# Patient Record
Sex: FEMALE | Race: WHITE | Employment: OTHER | URBAN - METROPOLITAN AREA
[De-identification: names, ages, dates, MRNs, and addresses within clinical notes are randomized per-mention and may not be internally consistent; named-entity substitution may affect disease eponyms.]

---

## 2017-01-16 ENCOUNTER — HOSPITAL ENCOUNTER (OUTPATIENT)
Dept: MRI IMAGING | Age: 82
Discharge: HOME | End: 2017-01-16
Attending: PSYCHIATRY & NEUROLOGY
Payer: MEDICARE

## 2017-01-16 DIAGNOSIS — D49.6 CAVERNOUS SINUS TUMOR (HCC): ICD-10-CM

## 2017-01-16 LAB — CREAT BLD-MCNC: 0.8 MG/DL (ref 0.6–1.3)

## 2017-01-16 PROCEDURE — 70553 MRI BRAIN STEM W/O & W/DYE: CPT

## 2017-01-16 PROCEDURE — 82565 ASSAY OF CREATININE: CPT

## 2017-01-16 PROCEDURE — 74011250636 HC RX REV CODE- 250/636: Performed by: PSYCHIATRY & NEUROLOGY

## 2017-01-16 PROCEDURE — A9585 GADOBUTROL INJECTION: HCPCS | Performed by: PSYCHIATRY & NEUROLOGY

## 2017-01-16 RX ADMIN — GADOBUTROL 7.5 ML: 604.72 INJECTION INTRAVENOUS at 13:33

## 2017-09-21 ENCOUNTER — HOSPITAL ENCOUNTER (OUTPATIENT)
Dept: ULTRASOUND IMAGING | Age: 82
Discharge: HOME | End: 2017-09-21
Attending: FAMILY MEDICINE
Payer: MEDICARE

## 2017-09-21 DIAGNOSIS — M79.661 BILATERAL CALF PAIN: ICD-10-CM

## 2017-09-21 DIAGNOSIS — M79.89 SWELLING OF LIMB: ICD-10-CM

## 2017-09-21 DIAGNOSIS — M79.662 BILATERAL CALF PAIN: ICD-10-CM

## 2017-09-21 PROCEDURE — 93971 EXTREMITY STUDY: CPT

## 2019-05-23 ENCOUNTER — HOSPITAL ENCOUNTER (OUTPATIENT)
Dept: VASCULAR SURGERY | Age: 84
Discharge: HOME | End: 2019-05-23
Attending: FAMILY MEDICINE
Payer: MEDICARE

## 2019-05-23 DIAGNOSIS — M79.651 RIGHT THIGH PAIN: ICD-10-CM

## 2019-05-23 PROCEDURE — 93971 EXTREMITY STUDY: CPT

## 2020-09-02 ENCOUNTER — HOSPITAL ENCOUNTER (OUTPATIENT)
Dept: MRI IMAGING | Age: 85
Discharge: HOME | End: 2020-09-02
Attending: NURSE PRACTITIONER
Payer: MEDICARE

## 2020-09-02 DIAGNOSIS — M51.36 DDD (DEGENERATIVE DISC DISEASE), LUMBAR: ICD-10-CM

## 2020-09-02 DIAGNOSIS — M54.50 LOW BACK PAIN, UNSPECIFIED BACK PAIN LATERALITY, UNSPECIFIED CHRONICITY, UNSPECIFIED WHETHER SCIATICA PRESENT: ICD-10-CM

## 2020-09-02 DIAGNOSIS — M54.31 RIGHT SIDED SCIATICA: ICD-10-CM

## 2020-09-02 PROCEDURE — 72148 MRI LUMBAR SPINE W/O DYE: CPT

## 2022-01-11 RX ORDER — OMEPRAZOLE 20 MG/1
20 TABLET, DELAYED RELEASE ORAL
COMMUNITY

## 2022-01-11 RX ORDER — DOXEPIN HYDROCHLORIDE 10 MG/1
10 CAPSULE ORAL
COMMUNITY

## 2022-01-11 RX ORDER — CHOLECALCIFEROL (VITAMIN D3) 125 MCG
1 CAPSULE ORAL AS NEEDED
COMMUNITY

## 2022-01-11 NOTE — PERIOP NOTES
[facility]  Ambulatory Surgery Unit  Pre-operative Instructions    Procedure Date  1/18            Tentative Arrival Time 1:15pm      1. On the day of your procedure, please report to the Ambulatory Surgery Unit Registration Desk and sign in at your designated time. The Ambulatory Surgery Unit is located in [facility] on the Critical access hospital side of the [facility] across from the [facility] building. Please have all of your health insurance cards and a photo ID. 2. You must have someone with you to drive you home as directed by your surgeon. 3. You may have a light breakfast and take normal morning medications. 4. We recommend you do not drink any alcoholic beverages for 24 hours before and after your procedure. 5. Contact your surgeons office for instructions on the following medications: non-steroidal anti-inflammatory drugs (i.e. Advil, Aleve), vitamins, and supplements. (Some surgeons will want you to stop these medications prior to surgery and others may allow you to take them)   **If you are currently taking Plavix, Coumadin, Aspirin and/or other blood-thinning agents, contact your surgeon for instructions. ** Your surgeon will partner with the physician prescribing these medications to determine if it is safe to stop or if you need to continue taking. Please do not stop taking these medications without instructions from your surgeon. 6. In an effort to help prevent surgical site infection, we ask that you shower with an anti-bacterial soap (i.e. Dial or Safeguard) on the morning of your procedure. Do not apply any lotions, powders, or deodorants after showering. 7. Wear comfortable clothes. Wear glasses instead of contacts. Do not bring any jewelry or money (other than copays or fees as instructed). Do not wear make-up, particularly mascara, the morning of your procedure. Wear your hair loose or down, no ponytails, buns, steve pins or clips.  All body piercings must be removed. 8. You should understand that if you do not follow these instructions your procedure may be cancelled. If your physical condition changes (i.e. fever, cold or flu) please contact your surgeon as soon as possible. 9. It is important that you be on time. If a situation occurs where you may be late, or if you have any questions or problems, please call [phone].    10. Your procedure time may be subject to change. You will receive a phone call the day prior to confirm your arrival time. I understand a pre-operative phone call will be made to verify my procedure time. In the event that I am not available, I give permission for a message to be left on my answering service and/or with another person?       yes    Reviewed by phone with daughter [facility]) verbalized understanding.     ___________________      ___________________      ___________________  (Signature of Patient)          (Witness)                   (Date and Time)

## 2022-01-18 ENCOUNTER — HOSPITAL ENCOUNTER (OUTPATIENT)
Age: 87
Setting detail: OUTPATIENT SURGERY
Discharge: HOME | End: 2022-01-18
Attending: PHYSICAL MEDICINE & REHABILITATION | Admitting: PHYSICAL MEDICINE & REHABILITATION
Payer: MEDICARE

## 2022-01-18 ENCOUNTER — APPOINTMENT (OUTPATIENT)
Dept: GENERAL RADIOLOGY | Age: 87
End: 2022-01-18
Attending: PHYSICAL MEDICINE & REHABILITATION
Payer: MEDICARE

## 2022-01-18 VITALS
OXYGEN SATURATION: 98 % | DIASTOLIC BLOOD PRESSURE: 76 MMHG | HEIGHT: 58 IN | BODY MASS INDEX: 29.39 KG/M2 | RESPIRATION RATE: 12 BRPM | WEIGHT: 140 LBS | HEART RATE: 70 BPM | TEMPERATURE: 97.9 F | SYSTOLIC BLOOD PRESSURE: 162 MMHG

## 2022-01-18 PROCEDURE — 74011000250 HC RX REV CODE- 250: Performed by: PHYSICAL MEDICINE & REHABILITATION

## 2022-01-18 PROCEDURE — 72020 X-RAY EXAM OF SPINE 1 VIEW: CPT

## 2022-01-18 PROCEDURE — 74011000636 HC RX REV CODE- 636: Performed by: PHYSICAL MEDICINE & REHABILITATION

## 2022-01-18 PROCEDURE — 76030000002 HC AMB SURG OR TIME FIRST 0.: Performed by: PHYSICAL MEDICINE & REHABILITATION

## 2022-01-18 PROCEDURE — 76000 FLUOROSCOPY <1 HR PHYS/QHP: CPT

## 2022-01-18 PROCEDURE — 77030003665 HC NDL SPN BBMI -A: Performed by: PHYSICAL MEDICINE & REHABILITATION

## 2022-01-18 PROCEDURE — 2709999900 HC NON-CHARGEABLE SUPPLY: Performed by: PHYSICAL MEDICINE & REHABILITATION

## 2022-01-18 PROCEDURE — 76210000046 HC AMBSU PH II REC FIRST 0.5 HR: Performed by: PHYSICAL MEDICINE & REHABILITATION

## 2022-01-18 PROCEDURE — 74011250636 HC RX REV CODE- 250/636: Performed by: PHYSICAL MEDICINE & REHABILITATION

## 2022-01-18 RX ORDER — BUPIVACAINE HYDROCHLORIDE 5 MG/ML
10 INJECTION, SOLUTION EPIDURAL; INTRACAUDAL ONCE
Status: DISCONTINUED | OUTPATIENT
Start: 2022-01-18 | End: 2022-01-18 | Stop reason: HOSPADM

## 2022-01-18 RX ORDER — DEXAMETHASONE SODIUM PHOSPHATE 4 MG/ML
6 INJECTION, SOLUTION INTRA-ARTICULAR; INTRALESIONAL; INTRAMUSCULAR; INTRAVENOUS; SOFT TISSUE ONCE
Status: COMPLETED | OUTPATIENT
Start: 2022-01-18 | End: 2022-01-18

## 2022-01-18 RX ORDER — LIDOCAINE HYDROCHLORIDE 20 MG/ML
10 INJECTION, SOLUTION INFILTRATION; PERINEURAL ONCE
Status: COMPLETED | OUTPATIENT
Start: 2022-01-18 | End: 2022-01-18

## 2022-01-18 NOTE — OP NOTES
Epidural Steroid Injection Operative Report    Indications: This is a 80 y.o. female who presents with low back pain. She was positive for LS DDD. The patient was admitted for surgery as conservative measures have failed. Date of Surgery: 1/18/2022    Preoperative Diagnosis: LS DDD    Postoperative Diagnosis: LS DDD    Surgeon(s) and Role:     * Zuly Villasenor MD - Primary     Procedure:  Procedure(s):  BILATERAL L5 TRANSFORAMINAL LUMBAR EPIDURAL STEROID INJECTION    Procedure in Detail:  After appropriate informed consent was obtained, the patient was taken to the operating suite and placed in the prone position on the operating table on appropriate padding. The LS region was prepped and draped in the usual sterile fashion. Intraoperative fluoroscopy was used to localize the LS spine. The skin was infiltrated with 2% lidocaine. A 25-g needle was advanced into the Jose L5 neuroforamen under fluoroscopic guidance. A small amount of contrast was injected into the epidural space, confirming appropriate needle placement on fluoroscopy. Next, 2ml of 2% lidocaine and 10mg of Dexamethasone were injected. The needle was removed from the patient. The patient was then turned back into the supine position on the stretcher and was taken to the Recovery Room in stable condition.     Estimated Blood Loss:  none     Specimens: None       Drains: None          Complications:  None    Signed By: Uvaldo Estes MD                        January 18, 2022

## 2022-01-18 NOTE — H&P
Procedural Case Note    1/18/2022    (1:39 PM)    Beverley Offer    7/11/1928   (80 y.o.)    [id number]    CC:  pain    ROS:   Complete ROS obtained, no CP, no SOB, no N or V    PMH:     Past Medical History:   Diagnosis Date    B12 deficiency 11/24/2015    Cancer [facility])     Colon and ovarian    Dementia ([address]     mixed: vascular, ?early Parkinsons, thyroid/b12    Hypertension     Hypothyroidism, acquired     NSAID-induced gastric ulcer 11/24/2015    Primary osteoarthritis of right shoulder 11/24/2015       ALLERGIES:     Allergies   Allergen Reactions    Sulfa (Sulfonamide Antibiotics) Hives       MEDS:     Current Facility-Administered Medications   Medication Dose Route Frequency    bupivacaine (PF) (MARCAINE) 0.5 % (5 mg/mL) injection 50 mg  10 mL Intra artICUlar ONCE    lidocaine (XYLOCAINE) 20 mg/mL (2 %) injection 200 mg  10 mL Other ONCE    dexamethasone (DECADRON) 10 mg/mL injection 6 mg  6 mg Intra artICUlar ONCE    iohexoL (OMNIPAQUE) 180 mg iodine/mL injection 10 mL  10 mL Intra artICUlar ONCE          Visit Vitals  Ht 4' 10\" (1.473 m)   Wt 63.5 kg (140 lb)   BMI 29.26 kg/m²     PE:  Gen: NAD  Head: normocephalic  Heart: RRR  Lungs: CTA hasmukh  Abd: NT, ND, soft  Neuro: awake and alert  Skin: intact    IMPRESSION:   LS DDD    Note:  The clinical status was discussed in detail with the patient. The procedure was again discussed and described in detail. All understand and accept the planned procedure and risks; reject other forms of treatment. All questions are answered.     Jolie Hanley MD

## 2022-01-18 NOTE — PERIOP NOTES
Patient: Melina Quinonez MRN: [id number]  SSN: [id number]   YOB: 1928  Age: 80 y.o. Sex: female     Patient is status post Procedure(s):  BILATERAL L5 TRANSFORAMINAL LUMBAR EPIDURAL STEROID INJECTION.     Surgeon(s) and Role:     * Zuly Villasenor MD - Primary    Local/Dose/Irrigation:  SEE MAR                                       Dressing/Packing:     Karthik Bose

## 2022-01-18 NOTE — PERIOP NOTES
[facility]  7/11/1928  [id number]    Situation:  Verbal report given from: LONI Wiseman RN  Procedure: Procedure(s):  BILATERAL L5 TRANSFORAMINAL LUMBAR EPIDURAL STEROID INJECTION    Background:    Preoperative diagnosis: DEGENERATIVE DISC DISEASE/LUMBAR SPONDYLOSIS    Postoperative diagnosis: DEGENERATIVE [address]    :  Dr. Kadie Love    Assistant(s): Circ-1: Abdiel Eden RN  Local Nurse Monitor: Ayala Pearson RN  Radiology Technician: RT Giuliana  Scrub Tech-1: RT Adalid; Sigifredo Mancilla    Specimens: * No specimens in log *    Assessment:  Intra-procedure medications         Anesthesia gave intra-procedure sedation and medications, see anesthesia flow sheet     Intravenous fluids: LR@ KVO     Vital signs stable       Recommendation:    Permission to share finding with family : yes

## 2022-01-18 NOTE — DISCHARGE INSTRUCTIONS
Dr. Ethel Sparks Discharge Instructions  Transforaminal Epidural Steroid Injection/ Selective Nerve Block    You had a transforaminal epidural steroid injection/ selective nerve block today. You will probably have some numbness, and possibly weakness, in your leg for the next 6 to 8 hours. The steroids will slowly become effective, reducing your pain, over the next 2 weeks. You should begin feeling better after a few days, but it may take up to 2 weeks to notice the difference. The benefit you get from your injection will last a variable amount of time, depending on the severity of your lumbar spine problem.  Pain: Most people do not have any increase in pain after this injection. However, you might experience some soreness in your low back. If this happens, putting an ice pack over the sore area will help.  Bandage: You will have a small bandage covering the site of the injection. You may remove it once you get home.  Restrictions: Someone should drive you home after the injection. After that, you have no restrictions. You need to be careful while walking, as you may still have some numbness or weakness in your leg. You may resume your normal level of activity. You may take a shower or bath, and you may eat normally. You should continue your current exercises and/or therapy routine.   Medications: Continue your current medications as prescribed. If your pain decreases, you may reduce the amount of your pain medicines. If you stopped taking anticoagulants or blood-thinners before the injection, start them tomorrow. If you have diabetes, your blood sugar may be elevated for a few days. Call your primary doctor with any questions.   Call Dr. Ethel Sparks at [phone] if you experience:   Fever (101 degrees Fahrenheit or greater)   Nausea or vomiting   Headache unrelieved by your normal pain medicine   Redness or swelling at the injection site that lasts more than 1 day   New numbness, tingling, weakness, or pain that you didnt have before the injection    Follow-up appointment:   If still having pain in 1-2 weeks, call office at [phone] for a follow up appointment. DISCHARGE SUMMARY from Nurse    The following personal items collected during your admission are returned to you:   Dental Appliance: Dental Appliances: None  Vision: Visual Aid: Glasses  Hearing Aid:    Jewelry: Jewelry: Ring,With patient (2 rings with patient)  Clothing: Clothing: With patient  Other Valuables: Other Valuables: Wheelchair (in PACU)  Valuables sent to safe: If you were given prescriptions, please review the written information on prescribed medications. · You will receive a Post Operative Call from one of the Recovery Room Nurses on the day after your surgery to check on you. It is very important for us to know how you are recovering after your surgery. · You may receive an e-mail or letter in the mail from [employer] regarding your experience with us in the Ambulatory Surgery Unit. Your feedback is valuable to us and we appreciate your participation in the survey. If you have not had your influenza or pneumococcal vaccines, please follow up with your primary care physician. The discharge information has been reviewed with the patient. The patient verbalized understanding.

## 2022-04-14 ENCOUNTER — APPOINTMENT (OUTPATIENT)
Dept: GENERAL RADIOLOGY | Age: 87
End: 2022-04-14
Attending: EMERGENCY MEDICINE
Payer: MEDICARE

## 2022-04-14 ENCOUNTER — APPOINTMENT (OUTPATIENT)
Dept: CT IMAGING | Age: 87
End: 2022-04-14
Attending: EMERGENCY MEDICINE
Payer: MEDICARE

## 2022-04-14 ENCOUNTER — HOSPITAL ENCOUNTER (EMERGENCY)
Age: 87
Discharge: HOME | End: 2022-04-14
Attending: EMERGENCY MEDICINE
Payer: MEDICARE

## 2022-04-14 VITALS
OXYGEN SATURATION: 93 % | TEMPERATURE: 99.1 F | HEART RATE: 82 BPM | WEIGHT: 149.69 LBS | RESPIRATION RATE: 22 BRPM | HEIGHT: 60 IN | DIASTOLIC BLOOD PRESSURE: 89 MMHG | SYSTOLIC BLOOD PRESSURE: 143 MMHG | BODY MASS INDEX: 29.39 KG/M2

## 2022-04-14 DIAGNOSIS — R63.0 POOR APPETITE: Primary | ICD-10-CM

## 2022-04-14 DIAGNOSIS — R41.0 EPISODE OF CONFUSION: ICD-10-CM

## 2022-04-14 LAB
ALBUMIN SERPL-MCNC: 3 G/DL (ref 3.5–5)
ALBUMIN/GLOB SERPL: 1 {RATIO} (ref 1.1–2.2)
ALP SERPL-CCNC: 68 U/L (ref 45–117)
ALT SERPL-CCNC: 12 U/L (ref 12–78)
ANION GAP SERPL CALC-SCNC: 5 MMOL/L (ref 5–15)
APPEARANCE UR: ABNORMAL
AST SERPL-CCNC: 15 U/L (ref 15–37)
BACTERIA URNS QL MICRO: NEGATIVE /HPF
BASOPHILS # BLD: 0 K/UL (ref 0–0.1)
BASOPHILS NFR BLD: 0 % (ref 0–1)
BILIRUB SERPL-MCNC: 0.8 MG/DL (ref 0.2–1)
BILIRUB UR QL: NEGATIVE
BUN SERPL-MCNC: 8 MG/DL (ref 6–20)
BUN/CREAT SERPL: 11 (ref 12–20)
CALCIUM SERPL-MCNC: 8.7 MG/DL (ref 8.5–10.1)
CHLORIDE SERPL-SCNC: 103 MMOL/L (ref 97–108)
CO2 SERPL-SCNC: 28 MMOL/L (ref 21–32)
COLOR UR: ABNORMAL
CREAT SERPL-MCNC: 0.74 MG/DL (ref 0.55–1.02)
DIFFERENTIAL METHOD BLD: ABNORMAL
EOSINOPHIL # BLD: 0 K/UL (ref 0–0.4)
EOSINOPHIL NFR BLD: 0 % (ref 0–7)
EPITH CASTS URNS QL MICRO: ABNORMAL /LPF
ERYTHROCYTE [DISTWIDTH] IN BLOOD BY AUTOMATED COUNT: 14.3 % (ref 11.5–14.5)
GLOBULIN SER CALC-MCNC: 3.1 G/DL (ref 2–4)
GLUCOSE SERPL-MCNC: 92 MG/DL (ref 65–100)
GLUCOSE UR STRIP.AUTO-MCNC: NEGATIVE MG/DL
HCT VFR BLD AUTO: 40.3 % (ref 35–47)
HGB BLD-MCNC: 12.7 G/DL (ref 11.5–16)
HGB UR QL STRIP: NEGATIVE
HYALINE CASTS URNS QL MICRO: ABNORMAL /LPF (ref 0–5)
IMM GRANULOCYTES # BLD AUTO: 0.1 K/UL (ref 0–0.04)
IMM GRANULOCYTES NFR BLD AUTO: 1 % (ref 0–0.5)
KETONES UR QL STRIP.AUTO: ABNORMAL MG/DL
LEUKOCYTE ESTERASE UR QL STRIP.AUTO: NEGATIVE
LYMPHOCYTES # BLD: 0.6 K/UL (ref 0.8–3.5)
LYMPHOCYTES NFR BLD: 7 % (ref 12–49)
MCH RBC QN AUTO: 30.6 PG (ref 26–34)
MCHC RBC AUTO-ENTMCNC: 31.5 G/DL (ref 30–36.5)
MCV RBC AUTO: 97.1 FL (ref 80–99)
MONOCYTES # BLD: 0.6 K/UL (ref 0–1)
MONOCYTES NFR BLD: 8 % (ref 5–13)
NEUTS SEG # BLD: 6.7 K/UL (ref 1.8–8)
NEUTS SEG NFR BLD: 84 % (ref 32–75)
NITRITE UR QL STRIP.AUTO: NEGATIVE
NRBC # BLD: 0 K/UL (ref 0–0.01)
NRBC BLD-RTO: 0 PER 100 WBC
PH UR STRIP: 6 [PH] (ref 5–8)
PLATELET # BLD AUTO: 221 K/UL (ref 150–400)
PMV BLD AUTO: 8.6 FL (ref 8.9–12.9)
POTASSIUM SERPL-SCNC: 4 MMOL/L (ref 3.5–5.1)
PROT SERPL-MCNC: 6.1 G/DL (ref 6.4–8.2)
PROT UR STRIP-MCNC: NEGATIVE MG/DL
RBC # BLD AUTO: 4.15 M/UL (ref 3.8–5.2)
RBC #/AREA URNS HPF: ABNORMAL /HPF (ref 0–5)
RBC MORPH BLD: ABNORMAL
SODIUM SERPL-SCNC: 136 MMOL/L (ref 136–145)
SP GR UR REFRACTOMETRY: 1.01 (ref 1–1.03)
UA: UC IF INDICATED,UAUC: ABNORMAL
UROBILINOGEN UR QL STRIP.AUTO: 0.2 EU/DL (ref 0.2–1)
WBC # BLD AUTO: 8 K/UL (ref 3.6–11)
WBC URNS QL MICRO: ABNORMAL /HPF (ref 0–4)

## 2022-04-14 PROCEDURE — 80053 COMPREHEN METABOLIC PANEL: CPT

## 2022-04-14 PROCEDURE — 81001 URINALYSIS AUTO W/SCOPE: CPT

## 2022-04-14 PROCEDURE — 99284 EMERGENCY DEPT VISIT MOD MDM: CPT

## 2022-04-14 PROCEDURE — 36415 COLL VENOUS BLD VENIPUNCTURE: CPT

## 2022-04-14 PROCEDURE — 74011250636 HC RX REV CODE- 250/636: Performed by: EMERGENCY MEDICINE

## 2022-04-14 PROCEDURE — 85025 COMPLETE CBC W/AUTO DIFF WBC: CPT

## 2022-04-14 PROCEDURE — 96360 HYDRATION IV INFUSION INIT: CPT

## 2022-04-14 PROCEDURE — 70450 CT HEAD/BRAIN W/O DYE: CPT

## 2022-04-14 PROCEDURE — 71045 X-RAY EXAM CHEST 1 VIEW: CPT

## 2022-04-14 RX ADMIN — SODIUM CHLORIDE 500 ML: 9 INJECTION, SOLUTION INTRAVENOUS at 16:23

## 2022-04-14 NOTE — ED NOTES
Pt straight cathed x1 attempt via sterile tech. Noted 100 mls return. Specimen sent to lab. Repositioned and turned in bed.

## 2022-04-14 NOTE — ED NOTES
Pt d/franca home with daughter. Written instructions given. Reviewed with daughter. Questions answered. Verbalized understanding. Pt assisted with dressing. Taken to car in w/c.

## 2022-04-14 NOTE — PROGRESS NOTES
CM consulted by ED physician to provide resources for family for more support at home. CM met with patient, her daughter and son in law at the bedside. CM explained the difference between home health and hiring caregivers in the home. CM also discussed [facility] as a resource for urgent care needs. CM provided the following resources:  · [facility]   · List of Agencies Providing Private Pay Caregivers   · [facility]     Patient address [address], [address] [facility], [address] [facility] also updated in patient chart. CM updated ED Physician on information provided to family. Patient to be discharged home with family.     Don Tate, BRIN, RN, BSW   RN Care Manager

## 2022-04-14 NOTE — ED PROVIDER NOTES
EMERGENCY DEPARTMENT HISTORY AND PHYSICAL EXAM      Date: 4/14/2022  Patient Name: Norma Martinez    History of Presenting Illness     Chief Complaint   Patient presents with    Altered mental status     altered loc/ hx of dementia       History Provided By: Patient's Daughter    HPI: Norma Martinez, 80 y.o. female with PMHx significant for dementia, hypertension, hypothyroidism, who presents with concerns for increasing confusion. Daughter reports the patient had been more confused for about 2 days. She has also not been eating for the last 2 or 3 days and has had a dry cough. She is quite confused at baseline but the daughter states she was much more fatigued this morning and they were having trouble getting her up out of bed. Per daughter, patient is currently back to her baseline confusion. Additional history and review of systems is limited by dementia      PCP: Baljeet Acosta, DO        Current Outpatient Medications   Medication Sig Dispense Refill    doxepin (SINEquan) 10 mg capsule Take 10 mg by mouth nightly.  naproxen sodium (Aleve) 220 mg cap Take 1 Tablet by mouth as needed.  omeprazole (PriLOSEC OTC) 20 mg tablet Take 20 mg by mouth daily as needed.  furosemide (LASIX) 20 mg tablet Take 20 mg by mouth daily as needed. Indications: visible water retention      levothyroxine (SYNTHROID) 100 mcg tablet TAKE ONE TABLET BY MOUTH EVERY MORNING BEFORE BREAKFAST (Patient taking differently: 50mcg on Friday) 60 Tab 0    cyanocobalamin 1,000 mcg tablet Take 1,000 mcg by mouth daily.  lisinopril (PRINIVIL, ZESTRIL) 10 mg tablet Take 1 Tab by mouth daily. For blood pressure 30 Tab 11    aspirin delayed-release 81 mg tablet Take 81 mg by mouth daily.  acetaminophen (TYLENOL) 500 mg tablet Take 650 mg by mouth two (2) times a day.        Past History     Past Medical History:  Past Medical History:   Diagnosis Date    B12 deficiency 11/24/2015    Cancer [facility])     Colon and ovarian  Dementia ([facility] [address]     mixed: vascular, ?early Parkinsons, thyroid/b12    Hypertension     Hypothyroidism, acquired     NSAID-induced gastric ulcer 11/24/2015    Primary osteoarthritis of right shoulder 11/24/2015     Past Surgical History:  Past Surgical History:   Procedure Laterality Date    HX GI      Colon cancer surgery    HX GYN      oopherectomy and hysterectomy    HX HEENT      tonsillectomy    HX ORTHOPAEDIC      carpal tunnel surgery    HX OTHER SURGICAL      lymph nodes removed from thigh R side    UPPER GI ENDOSCOPY,BIOPSY  10/26/2015          Family History:  Family History   Family history unknown: Yes     Social History:  Social History     Tobacco Use    Smoking status: Former Smoker    Smokeless tobacco: Never Used   Substance Use Topics    Alcohol use: No     Alcohol/week: 0.0 standard drinks    Drug use: No     Allergies: Allergies   Allergen Reactions    Sulfa (Sulfonamide Antibiotics) Hives     Review of Systems   Review of Systems   Unable to perform ROS: Dementia     Physical Exam   Physical Exam  Vitals and nursing note reviewed. Constitutional:       General: She is not in acute distress. Appearance: She is well-developed. HENT:      Head: Normocephalic and atraumatic. Mouth/Throat:      Mouth: Mucous membranes are dry. Eyes:      Conjunctiva/sclera: Conjunctivae normal.      Pupils: Pupils are equal, round, and reactive to light. Cardiovascular:      Rate and Rhythm: Normal rate and regular rhythm. Comments: Trace bilateral pitting edema  Pulmonary:      Effort: Pulmonary effort is normal. No respiratory distress. Breath sounds: Normal breath sounds. No stridor. Abdominal:      General: There is no distension. Palpations: Abdomen is soft. Tenderness: There is no abdominal tenderness. Musculoskeletal:         General: Normal range of motion. Cervical back: Normal range of motion. Skin:     General: Skin is warm and dry. Neurological:      Mental Status: She is alert. Mental status is at baseline. Diagnostic Study Results   Labs -     Recent Results (from the past 12 hour(s))   CBC WITH AUTOMATED DIFF    Collection Time: 04/14/22  3:09 PM   Result Value Ref Range    WBC 8.0 3.6 - 11.0 K/uL    RBC 4.15 3.80 - 5.20 M/uL    HGB 12.7 11.5 - 16.0 g/dL    HCT 40.3 35.0 - 47.0 %    MCV 97.1 80.0 - 99.0 FL    MCH 30.6 26.0 - 34.0 PG    MCHC 31.5 30.0 - 36.5 g/dL    RDW 14.3 11.5 - 14.5 %    PLATELET 327 263 - 363 K/uL    MPV 8.6 (L) 8.9 - 12.9 FL    NRBC 0.0 0  WBC    ABSOLUTE NRBC 0.00 0.00 - 0.01 K/uL    NEUTROPHILS 84 (H) 32 - 75 %    LYMPHOCYTES 7 (L) 12 - 49 %    MONOCYTES 8 5 - 13 %    EOSINOPHILS 0 0 - 7 %    BASOPHILS 0 0 - 1 %    IMMATURE GRANULOCYTES 1 (H) 0.0 - 0.5 %    ABS. NEUTROPHILS 6.7 1.8 - 8.0 K/UL    ABS. LYMPHOCYTES 0.6 (L) 0.8 - 3.5 K/UL    ABS. MONOCYTES 0.6 0.0 - 1.0 K/UL    ABS. EOSINOPHILS 0.0 0.0 - 0.4 K/UL    ABS. BASOPHILS 0.0 0.0 - 0.1 K/UL    ABS. IMM. GRANS. 0.1 (H) 0.00 - 0.04 K/UL    DF SMEAR SCANNED      RBC COMMENTS NORMOCYTIC, NORMOCHROMIC     METABOLIC PANEL, COMPREHENSIVE    Collection Time: 04/14/22  3:09 PM   Result Value Ref Range    Sodium 136 136 - 145 mmol/L    Potassium 4.0 3.5 - 5.1 mmol/L    Chloride 103 97 - 108 mmol/L    CO2 28 21 - 32 mmol/L    Anion gap 5 5 - 15 mmol/L    Glucose 92 65 - 100 mg/dL    BUN 8 6 - 20 MG/DL    Creatinine 0.74 0.55 - 1.02 MG/DL    BUN/Creatinine ratio 11 (L) 12 - 20      GFR est AA >60 >60 ml/min/1.73m2    GFR est non-AA >60 >60 ml/min/1.73m2    Calcium 8.7 8.5 - 10.1 MG/DL    Bilirubin, total 0.8 0.2 - 1.0 MG/DL    ALT (SGPT) 12 12 - 78 U/L    AST (SGOT) 15 15 - 37 U/L    Alk.  phosphatase 68 45 - 117 U/L    Protein, total 6.1 (L) 6.4 - 8.2 g/dL    Albumin 3.0 (L) 3.5 - 5.0 g/dL    Globulin 3.1 2.0 - 4.0 g/dL    A-G Ratio 1.0 (L) 1.1 - 2.2     URINALYSIS W/ REFLEX CULTURE    Collection Time: 04/14/22  3:24 PM    Specimen: Urine   Result Value Ref Range    Color YELLOW/STRAW      Appearance CLOUDY (A) CLEAR      Specific gravity 1.014 1.003 - 1.030      pH (UA) 6.0 5.0 - 8.0      Protein Negative NEG mg/dL    Glucose Negative NEG mg/dL    Ketone TRACE (A) NEG mg/dL    Bilirubin Negative NEG      Blood Negative NEG      Urobilinogen 0.2 0.2 - 1.0 EU/dL    Nitrites Negative NEG      Leukocyte Esterase Negative NEG      WBC 0-4 0 - 4 /hpf    RBC 0-5 0 - 5 /hpf    Epithelial cells MANY (A) FEW /lpf    Bacteria Negative NEG /hpf    UA:UC IF INDICATED CULTURE NOT INDICATED BY UA RESULT CNI      Hyaline cast 0-2 0 - 5 /lpf       Radiologic Studies -   XR CHEST PORT   Final Result   impression: No acute changes. CT HEAD WO CONT   Final Result   No acute findings suspected. CT HEAD WO CONT    Result Date: 4/14/2022  No acute findings suspected. XR CHEST PORT    Result Date: 4/14/2022  impression: No acute changes. Medical Decision Making   I am the first provider for this patient. I reviewed the vital signs, available nursing notes, past medical history, past surgical history, family history and social history. Vital Signs-Reviewed the patient's vital signs. Patient Vitals for the past 12 hrs:   Temp Pulse Resp BP SpO2   04/14/22 1705  82 22 (!) 143/89 93 %   04/14/22 1700 99.1 °F (37.3 °C)       04/14/22 1544 99.1 °F (37.3 °C)       04/14/22 1531  85 25 (!) 148/72 92 %   04/14/22 1405  85 (!) 33 135/79 93 %   04/14/22 1344 99.8 °F (37.7 °C) 93 28 (!) 157/126 94 %       Pulse Oximetry Analysis - 93% on ra      Records Reviewed: Nursing Notes and Old Medical Records    Provider Notes (Medical Decision Making):   Patient presents with concerns for worsening confusion. Alert but very confused on my evaluation, per daughter this is her baseline. Will check basic lab work, urinalysis, CT head, chest x-ray to assess for causes of altered mental status such as electrolyte imbalance, UTI, intracranial process.   This also could be worsening of her dementia    ED Course:   Initial assessment performed. The patients presenting problems have been discussed, and they are in agreement with the care plan formulated and outlined with them. I have encouraged them to ask questions as they arise throughout their visit. No acute findings on lab work or imaging. Patient did receive some fluids. I advised a call her PCP to discuss appetite enhancing medications. Case management also did meet with the family to offer dispatch health and other resources for additional help at home. Procedures:  Procedures    Critical Care:  none    Disposition:  Discharge Note:  The patient has been re-evaluated and is ready for discharge. Reviewed available results with patient. Counseled patient on diagnosis and care plan. Patient has expressed understanding, and all questions have been answered. Patient agrees with plan and agrees to follow up as recommended, or to return to the ED if their symptoms worsen. Discharge instructions have been provided and explained to the patient, along with reasons to return to the ED. PLAN:  1. Discharge Medication List as of 4/14/2022  5:15 PM        2. Follow-up Information     Follow up With Specialties Details Why Contact Mateo Goldman DO Family Medicine Schedule an appointment as soon as possible for a visit   [facility]  [phone]      [facility] EMERGENCY DEPT Emergency Medicine  As needed, If symptoms worsen [address]  [address] [facility]  [phone]        Return to ED if worse     Diagnosis     Clinical Impression:   1. Poor appetite    2. Episode of confusion            Please note that this dictation was completed with [employer], the computer voice recognition software. Quite often unanticipated grammatical, syntax, homophones, and other interpretive errors are inadvertently transcribed by the computer software.   Please disregard these errors.   Please excuse any errors that have escaped final proofreading

## 2022-04-14 NOTE — ED NOTES
Pt is a poor historian with hx of dementia. Alert but confused. Daughter to bedside and reports altered LOC this am, cough, poor appetite.

## (undated) DEVICE — TOWEL SURG W17XL27IN STD BLU COT NONFENESTRATED PREWASHED

## (undated) DEVICE — SYR 10ML LUER LOK 1/5ML GRAD --

## (undated) DEVICE — MARKER,SKIN,WI/RULER AND LABELS: Brand: MEDLINE

## (undated) DEVICE — GLOVE ORANGE PI 8   MSG9080

## (undated) DEVICE — SYR 5ML 1/5 GRAD LL NSAF LF --

## (undated) DEVICE — NEEDLE SPNL L4.75IN OD25GA QNCKE TYP SPINOCAN

## (undated) DEVICE — ADULT SPO2 SENSOR: Brand: NELLCOR

## (undated) DEVICE — Device: Brand: JELCO

## (undated) DEVICE — PREP SKN CHLRAPRP APL 26ML STR --

## (undated) DEVICE — POLYLINED TOWEL: Brand: CONVERTORS

## (undated) DEVICE — HYPODERMIC SAFETY NEEDLE: Brand: MONOJECT

## (undated) DEVICE — SET EXTN PRIMING 0.59ML 8.5IN 1.55LB PRSS RATE MINIBOR PUR